# Patient Record
(demographics unavailable — no encounter records)

---

## 2025-03-04 NOTE — PROCEDURE
[de-identified] : Indication: requirement for exam not possible via anterior rhinoscopy; chronic nasal obstruction After verbal consent and the administration of an aerosolized oxymetazoline/lidocaine mix, examination was performed with a flexible endoscope attached to a video monitoring system. Findings: Septum: wide L DNS impacting the IT & MT Mucosa: normal Polyposis: not present Inferior turbinates: very large dahlia on the R, pos Afrin test Middle and superior turbinates: normal Inferior meatus: unremarkable Middle meatus: unremarkable Superior meatus: unremarkable Speno-ethmoidal recess: unremarkable Nasopharynx: unremarkable Secretions: unremarkable Other findings: none [de-identified] : Indication: requirement for exam not possible via anterior examination; snoring r/o GINO After verbal consent and the administration of an aerosolized oxymetazoline/lidocaine mix, examination was performed with a flexible endoscope placed through the nose which was attached to a video monitoring system. Findings: Nasopharynx: unremarkable Soft palate, lateral and posterior pharyngeal walls: unremarkable Base of tongue & lingual tonsil: moderate retrodisplacement Vallecula: unremarkable Epiglottis: unremarkable Piriform sinuses and pharyngoesophageal junction: unremarkable Arytenoids and AE folds: moderate interarytenoid edema Ventricle and false vocal folds: unremarkable True vocal folds: Smooth free edge; surface without ectasias or edema; normal movement bilaterally with good apposition in phonation Visible subglottis: unremarkable Narrow-band imaging: not used Other findings: ELM

## 2025-03-04 NOTE — PHYSICAL EXAM
[Binocular Microscopic Exam] : Binocular microscopic exam was performed [FreeTextEntry8] : Obstructing cerumen was removed with a hook [FreeTextEntry9] : Obstructing cerumen was removed with a hook [Nasal Endoscopy Performed] : nasal endoscopy was performed, see procedure section for findings [] : septum deviated to the left [de-identified] :  Large inferior turbinates; positive Afrin test [Laryngoscopy Performed] : laryngoscopy was performed, see procedure section for findings [de-identified] : 0+ [Normal] : no masses and lesions seen, face is symmetric

## 2025-03-04 NOTE — ASSESSMENT
[FreeTextEntry1] : We discussed a likely septo/turbs; trial fluticasone & RTC one month  We discussed how to have a family member perform a home apnea screen; bring this information upon return along with cell phone video if unsure.  We discussed allergen mitigation and provided the patient with the corresponding educational handout; reviewed proper nasal steroid administration technique.    upon demand; Discussed appropriate use of hearing protection & loud noise avoidance.

## 2025-03-04 NOTE — CONSULT LETTER
[Dear  ___] : Dear ~GOKUL, [Consult Letter:] : I had the pleasure of evaluating your patient, [unfilled]. [Please see my note below.] : Please see my note below. [Consult Closing:] : Thank you very much for allowing me to participate in the care of this patient.  If you have any questions, please do not hesitate to contact me. [Sincerely,] : Sincerely, [FreeTextEntry3] : GARRETT Kapoor Jr, MD, FAAOHNS Otolaryngologist [facility]

## 2025-03-04 NOTE — HISTORY OF PRESENT ILLNESS
[de-identified] : Several years of snoring; no witnessed apneas but some daytime sleepiness and sleep isn't refreshing. Some morning headaches; no hx HTN.  Mouth breathing both night and day with a blocked nose. Laying down makes this worse. Some allergy to his cats- no meds for this.  His hearing seems to be decreasing slightly; nonpulsatile tinnitus only after loud concerts. Denies: ear pain, drainage, frequent loud noise exp/shooting, frequent infections, hx of ear surgery or IV antibiotics/chemo; denies a FHx of hearing loss. Qtip use: no